# Patient Record
Sex: MALE | Race: WHITE | Employment: FULL TIME
[De-identification: names, ages, dates, MRNs, and addresses within clinical notes are randomized per-mention and may not be internally consistent; named-entity substitution may affect disease eponyms.]

---

## 2024-09-04 ENCOUNTER — TELEPHONE (OUTPATIENT)
Facility: HOSPITAL | Age: 34
End: 2024-09-04

## 2024-09-06 ENCOUNTER — TELEPHONE (OUTPATIENT)
Facility: HOSPITAL | Age: 34
End: 2024-09-06

## 2024-09-06 ENCOUNTER — HOSPITAL ENCOUNTER (OUTPATIENT)
Facility: HOSPITAL | Age: 34
Setting detail: RECURRING SERIES
Discharge: HOME | End: 2024-09-09
Payer: COMMERCIAL

## 2024-09-06 PROCEDURE — 97535 SELF CARE MNGMENT TRAINING: CPT

## 2024-09-06 PROCEDURE — 97161 PT EVAL LOW COMPLEX 20 MIN: CPT

## 2024-09-06 PROCEDURE — 97110 THERAPEUTIC EXERCISES: CPT

## 2024-09-09 ENCOUNTER — HOSPITAL ENCOUNTER (OUTPATIENT)
Facility: HOSPITAL | Age: 34
Setting detail: RECURRING SERIES
Discharge: HOME | End: 2024-09-12
Payer: COMMERCIAL

## 2024-09-09 PROCEDURE — 97112 NEUROMUSCULAR REEDUCATION: CPT

## 2024-09-09 PROCEDURE — 97016 VASOPNEUMATIC DEVICE THERAPY: CPT

## 2024-09-09 PROCEDURE — 97110 THERAPEUTIC EXERCISES: CPT

## 2024-09-09 PROCEDURE — 97116 GAIT TRAINING THERAPY: CPT

## 2024-09-13 ENCOUNTER — HOSPITAL ENCOUNTER (OUTPATIENT)
Facility: HOSPITAL | Age: 34
Setting detail: RECURRING SERIES
Discharge: HOME | End: 2024-09-16
Payer: COMMERCIAL

## 2024-09-13 PROCEDURE — 97530 THERAPEUTIC ACTIVITIES: CPT

## 2024-09-13 PROCEDURE — 97110 THERAPEUTIC EXERCISES: CPT

## 2024-09-13 PROCEDURE — 97112 NEUROMUSCULAR REEDUCATION: CPT

## 2024-09-13 PROCEDURE — 97016 VASOPNEUMATIC DEVICE THERAPY: CPT

## 2024-09-16 ENCOUNTER — HOSPITAL ENCOUNTER (OUTPATIENT)
Facility: HOSPITAL | Age: 34
Setting detail: RECURRING SERIES
Discharge: HOME | End: 2024-09-19
Payer: COMMERCIAL

## 2024-09-16 PROCEDURE — 97110 THERAPEUTIC EXERCISES: CPT

## 2024-09-16 PROCEDURE — 97530 THERAPEUTIC ACTIVITIES: CPT

## 2024-09-16 PROCEDURE — 97016 VASOPNEUMATIC DEVICE THERAPY: CPT

## 2024-09-16 PROCEDURE — 97112 NEUROMUSCULAR REEDUCATION: CPT

## 2024-09-18 ENCOUNTER — TELEPHONE (OUTPATIENT)
Facility: HOSPITAL | Age: 34
End: 2024-09-18

## 2024-09-20 ENCOUNTER — HOSPITAL ENCOUNTER (OUTPATIENT)
Facility: HOSPITAL | Age: 34
Setting detail: RECURRING SERIES
Discharge: HOME | End: 2024-09-23
Payer: COMMERCIAL

## 2024-09-20 PROCEDURE — 97530 THERAPEUTIC ACTIVITIES: CPT

## 2024-09-20 PROCEDURE — 97112 NEUROMUSCULAR REEDUCATION: CPT

## 2024-09-20 PROCEDURE — 97110 THERAPEUTIC EXERCISES: CPT

## 2024-09-23 ENCOUNTER — HOSPITAL ENCOUNTER (OUTPATIENT)
Facility: HOSPITAL | Age: 34
Setting detail: RECURRING SERIES
Discharge: HOME | End: 2024-09-26
Payer: COMMERCIAL

## 2024-09-23 PROCEDURE — 97112 NEUROMUSCULAR REEDUCATION: CPT

## 2024-09-23 PROCEDURE — 97110 THERAPEUTIC EXERCISES: CPT

## 2024-09-23 PROCEDURE — 97530 THERAPEUTIC ACTIVITIES: CPT

## 2024-09-27 ENCOUNTER — HOSPITAL ENCOUNTER (OUTPATIENT)
Facility: HOSPITAL | Age: 34
Setting detail: RECURRING SERIES
Discharge: HOME | End: 2024-09-30
Payer: COMMERCIAL

## 2024-09-27 PROCEDURE — 97110 THERAPEUTIC EXERCISES: CPT

## 2024-09-27 PROCEDURE — 97530 THERAPEUTIC ACTIVITIES: CPT

## 2024-09-27 PROCEDURE — 97112 NEUROMUSCULAR REEDUCATION: CPT

## 2024-09-27 NOTE — PROGRESS NOTES
PHYSICAL / OCCUPATIONAL THERAPY - DAILY TREATMENT NOTE     Patient Name: Torsten Starks    Date: 2024    : 1990  Insurance: Payor: REBA / Plan: REBA POS / Product Type: *No Product type* /      Patient  verified Yes     Visit #   Current / Total 7 24   Time   In / Out 11:25 12:10   Pain   In / Out 0/10 0/10   Subjective Functional Status/Changes: Pt states he is doing well today   Changes to:  Allergies, Med Hx, Sx Hx?   no       TREATMENT AREA =  Right knee pain [M25.561]    If an interpreting service is utilized for treatment of this patient, the contents of this document represent the material reviewed with the patient via the .     OBJECTIVE    Therapeutic Procedures:  Tx Min Billable or 1:1 Min (if diff from Tx Min) Procedure, Rationale, Specifics   25  14428 Therapeutic Exercise (timed):  increase ROM, strength, coordination, balance, and proprioception to improve patient's ability to progress to PLOF and address remaining functional goals. (see flow sheet as applicable)    Details if applicable:       10  16586 Neuromuscular Re-Education (timed):  improve balance, coordination, kinesthetic sense, posture, core stability and proprioception to improve patient's ability to develop conscious control of individual muscles and awareness of position of extremities in order to progress to PLOF and address remaining functional goals. (see flow sheet as applicable)    Details if applicable:     10  16487 Therapeutic Activity (timed):  use of dynamic activities replicating functional movements to increase ROM, strength, coordination, balance, and proprioception in order to improve patient's ability to progress to PLOF and address remaining functional goals.  (see flow sheet as applicable)     Details if applicable:           Details if applicable:            Details if applicable:     45  [facility] Totals Reminder: bill using total billable min of TIMED therapeutic procedures (example: do

## 2024-09-30 ENCOUNTER — HOSPITAL ENCOUNTER (OUTPATIENT)
Facility: HOSPITAL | Age: 34
Setting detail: RECURRING SERIES
Discharge: HOME | End: 2024-10-03
Payer: COMMERCIAL

## 2024-09-30 PROCEDURE — 97112 NEUROMUSCULAR REEDUCATION: CPT

## 2024-09-30 PROCEDURE — 97110 THERAPEUTIC EXERCISES: CPT

## 2024-09-30 PROCEDURE — 97016 VASOPNEUMATIC DEVICE THERAPY: CPT

## 2024-09-30 PROCEDURE — 97530 THERAPEUTIC ACTIVITIES: CPT

## 2024-09-30 NOTE — PROGRESS NOTES
PHYSICAL / OCCUPATIONAL THERAPY - DAILY TREATMENT NOTE     Patient Name: Torsten Starks    Date: 2024    : 1990  Insurance: Payor: REBA / Plan: REBA POS / Product Type: *No Product type* /      Patient  verified Yes     Visit #   Current / Total 8 24   Time   In / Out 1810   Pain   In / Out 0 0   Subjective Functional Status/Changes: \"I have been consistent with initial HEP.\"   Changes to:  Allergies, Med Hx, Sx Hx?   no       TREATMENT AREA =  Right knee pain [M25.561]    If an interpreting service is utilized for treatment of this patient, the contents of this document represent the material reviewed with the patient via the .     OBJECTIVE  Modalities Rationale:     decrease edema, decrease inflammation, decrease pain, increase tissue extensibility, and increase muscle contraction/control to improve patient's ability to progress to PLOF and address remaining functional goals.            10 min [x]  Vasopneumatic Device, press/temp: Med/34 degrees   If using vaso (only need to measure limb vaso being performed on)      pre-treatment girth : 40.0 cm      post-treatment girth :39.8 cm      measured at (landmark location) :   Right knee mid patella     min []  Other:     Skin assessment post-treatment (if applicable):    []  intact    []  redness- no adverse reaction                 []redness - adverse reaction:         Therapeutic Procedures:  Tx Min Billable or 1:1 Min (if diff from Tx Min) Procedure, Rationale, Specifics   63 [id number] Therapeutic Exercise (timed):  increase ROM, strength, coordination, balance, and proprioception to improve patient's ability to progress to PLOF and address remaining functional goals. (see flow sheet as applicable)    Details if applicable:       10  77419 Neuromuscular Re-Education (timed):  improve balance, coordination, kinesthetic sense, posture, core stability and proprioception to improve patient's ability to develop conscious control of

## 2024-10-04 ENCOUNTER — HOSPITAL ENCOUNTER (OUTPATIENT)
Facility: HOSPITAL | Age: 34
Setting detail: RECURRING SERIES
Discharge: HOME | End: 2024-10-07
Payer: COMMERCIAL

## 2024-10-04 PROCEDURE — 97530 THERAPEUTIC ACTIVITIES: CPT

## 2024-10-04 PROCEDURE — 97016 VASOPNEUMATIC DEVICE THERAPY: CPT

## 2024-10-04 PROCEDURE — 97110 THERAPEUTIC EXERCISES: CPT

## 2024-10-04 PROCEDURE — 97112 NEUROMUSCULAR REEDUCATION: CPT

## 2024-10-04 NOTE — PROGRESS NOTES
PHYSICAL / OCCUPATIONAL THERAPY - DAILY TREATMENT NOTE     Patient Name: Torsten Starks    Date: 10/4/2024    : 1990  Insurance: Payor: REBA / Plan: REBA POS / Product Type: *No Product type* /      Patient  verified Yes     Visit #   Current / Total 9 24   Time   In / Out 11:30 am 12:20 pm   Pain   In / Out 0 0   Subjective Functional Status/Changes: Pt reports he has been doing well. Notes he has not had any knee buckling issues though is still hesitant with using his knee.   Changes to:  Allergies, Med Hx, Sx Hx?   no       TREATMENT AREA =  Right knee pain [M25.561]    If an interpreting service is utilized for treatment of this patient, the contents of this document represent the material reviewed with the patient via the .     OBJECTIVE  Modalities Rationale:     decrease edema, decrease inflammation, decrease pain, increase tissue extensibility, and increase muscle contraction/control to improve patient's ability to progress to PLOF and address remaining functional goals.            10 min [x]  Vasopneumatic Device, press/temp: Med/34 degrees   If using vaso (only need to measure limb vaso being performed on)      pre-treatment girth : 40.0 cm      post-treatment girth :39.8 cm      measured at (landmark location) :   Right knee mid patella     min []  Other:     Skin assessment post-treatment (if applicable):    []  intact    []  redness- no adverse reaction                 []redness - adverse reaction:         Therapeutic Procedures:  Tx Min Billable or 1:1 Min (if diff from Tx Min) Procedure, Rationale, Specifics   23  00715 Therapeutic Exercise (timed):  increase ROM, strength, coordination, balance, and proprioception to improve patient's ability to progress to PLOF and address remaining functional goals. (see flow sheet as applicable)    Details if applicable:       8  60532 Neuromuscular Re-Education (timed):  improve balance, coordination, kinesthetic sense, posture,

## 2024-10-07 ENCOUNTER — HOSPITAL ENCOUNTER (OUTPATIENT)
Facility: HOSPITAL | Age: 34
Setting detail: RECURRING SERIES
Discharge: HOME | End: 2024-10-10
Payer: COMMERCIAL

## 2024-10-07 PROCEDURE — 97016 VASOPNEUMATIC DEVICE THERAPY: CPT

## 2024-10-07 PROCEDURE — 97110 THERAPEUTIC EXERCISES: CPT

## 2024-10-07 PROCEDURE — 97112 NEUROMUSCULAR REEDUCATION: CPT

## 2024-10-07 PROCEDURE — 97530 THERAPEUTIC ACTIVITIES: CPT

## 2024-10-07 NOTE — PROGRESS NOTES
PHYSICAL / OCCUPATIONAL THERAPY - DAILY TREATMENT NOTE     Patient Name: Torsten Starks    Date: 10/7/2024    : 1990  Insurance: Payor: REBA / Plan: REBA POS / Product Type: *No Product type* /      Patient  verified Yes     Visit #   Current / Total 10 24   Time   In / Out 1720 1820   Pain   In / Out 0 0   Subjective Functional Status/Changes: \"I have a follow-up with the surgeon tomorrow.\"    Changes to:  Allergies, Med Hx, Sx Hx?   no       TREATMENT AREA =  Right knee pain [M25.561]    If an interpreting service is utilized for treatment of this patient, the contents of this document represent the material reviewed with the patient via the .     OBJECTIVE  Modalities Rationale:     decrease edema, decrease inflammation, decrease pain, increase tissue extensibility, and increase muscle contraction/control to improve patient's ability to progress to PLOF and address remaining functional goals.            10 min [x]  Vasopneumatic Device, press/temp: Med/34 degrees   If using vaso (only need to measure limb vaso being performed on)      pre-treatment girth : 39.5 cm      post-treatment girth :39.2 cm      measured at (landmark location) :   Right knee mid patella     min []  Other:     Skin assessment post-treatment (if applicable):    []  intact    []  redness- no adverse reaction                 []redness - adverse reaction:         Therapeutic Procedures:  Tx Min Billable or 1:1 Min (if diff from Tx Min) Procedure, Rationale, Specifics   25  53448 Therapeutic Exercise (timed):  increase ROM, strength, coordination, balance, and proprioception to improve patient's ability to progress to PLOF and address remaining functional goals. (see flow sheet as applicable)    Details if applicable:       15  10149 Neuromuscular Re-Education (timed):  improve balance, coordination, kinesthetic sense, posture, core stability and proprioception to improve patient's ability to develop conscious 
ambulation/ADLs.  Eval Status: 8-60 AAROM  Current: In-progress, 3-125 degrees, 10/7/2024     Pt will have 5/5 right knee ext and flex strength to return to goals of lifting void of impairment.  Eval Status: grossly 2/5  Current: In-progress, 3+/5 grossly, 10/7/2024     Patient will demonstrate ability to perform SL squat x 10 reps to 60 degrees knee flexion with good control to facilitate progression to return to plyometrics and running.  Eval Status: unable to perform due to post op status  Current: progressing with eccentric step downs with some UE assist 10/4/24     Pt demonstrate ability to reciprocally negotiate stairs  void of AD with good mechanics void of compensatory movement to improve ease of community mobility.  Eval Status: non-reciprocal with B crutch assist  Current: Pt reports ability to perform reciprocal patterning when ascending stairs but step to pattern when descending stairs utilizing B handrails 9/16/24  Current: pt states that he continues to use compensatory strategies, including \"turning to the side a little bit,\" in order to be able to achieve reciprocal gait pattern when descending stairs.  Pt states he is still using bilateral handrails as well.  9/27/24 in-progress     6. Pt will demonstrate SL hop, triple hop, crossover hop of 90% of contralateral side to facilitate progression towards return to recreational sport participation.              Eval status: not performed due to post op status.    Summary of Care/ Key Functional Changes:   Patient is 5 weeks s/p right ACL reconstruction. Patient is progressing well toward his short and long term goals in accordance to ACL rehabilitation protocol. He is compliant with initial HEP. Patient is developing strength and functional mobility. He continues to demonstrate LE weakness. He has transitioned to initial WB strengthening exercise and will continue to progress in accordance to post surgical rehabilitation protocol.      Patient will

## 2024-10-10 ENCOUNTER — HOSPITAL ENCOUNTER (OUTPATIENT)
Facility: HOSPITAL | Age: 34
Setting detail: RECURRING SERIES
Discharge: HOME | End: 2024-10-13
Payer: COMMERCIAL

## 2024-10-10 PROCEDURE — 97530 THERAPEUTIC ACTIVITIES: CPT

## 2024-10-10 PROCEDURE — 97112 NEUROMUSCULAR REEDUCATION: CPT

## 2024-10-10 PROCEDURE — 97110 THERAPEUTIC EXERCISES: CPT

## 2024-10-10 PROCEDURE — 97016 VASOPNEUMATIC DEVICE THERAPY: CPT

## 2024-10-10 NOTE — PROGRESS NOTES
Skin assessment post-treatment (if applicable):    [x]  intact    []  redness- no adverse reaction                 []redness - adverse reaction:         Therapeutic Procedures:  Tx Min Billable or 1:1 Min (if diff from Tx Min) Procedure, Rationale, Specifics   23  83631 Therapeutic Exercise (timed):  increase ROM, strength, coordination, balance, and proprioception to improve patient's ability to progress to PLOF and address remaining functional goals. (see flow sheet as applicable)    Details if applicable:       10  12067 Neuromuscular Re-Education (timed):  improve balance, coordination, kinesthetic sense, posture, core stability and proprioception to improve patient's ability to develop conscious control of individual muscles and awareness of position of extremities in order to progress to PLOF and address remaining functional goals. (see flow sheet as applicable)    Details if applicable:     9  62169 Therapeutic Activity (timed):  use of dynamic activities replicating functional movements to increase ROM, strength, coordination, balance, and proprioception in order to improve patient's ability to progress to PLOF and address remaining functional goals.  (see flow sheet as applicable)     Details if applicable:           Details if applicable:            Details if applicable:     42  [facility] Totals Reminder: bill using total billable min of TIMED therapeutic procedures (example: do not include dry needle or estim unattended, both untimed codes, in totals to left)  8-22 min = 1 unit; 23-37 min = 2 units; 38-52 min = 3 units; 53-67 min = 4 units; 68-82 min = 5 units   Total Total     TOTAL TREATMENT TIME:        52     [x]  Patient Education billed concurrently with other procedures   [x] Review HEP    [] Progressed/Changed HEP, detail:    [] Other detail:       Objective Information/Functional Measures/Assessment    Pt reports ability to perform reciprocal patterning when ascending stairs with one handrail for

## 2024-10-11 ENCOUNTER — APPOINTMENT (OUTPATIENT)
Facility: HOSPITAL | Age: 34
End: 2024-10-11
Payer: COMMERCIAL

## 2024-10-14 ENCOUNTER — HOSPITAL ENCOUNTER (OUTPATIENT)
Facility: HOSPITAL | Age: 34
Setting detail: RECURRING SERIES
Discharge: HOME | End: 2024-10-17
Payer: COMMERCIAL

## 2024-10-14 PROCEDURE — 97112 NEUROMUSCULAR REEDUCATION: CPT

## 2024-10-14 PROCEDURE — 97110 THERAPEUTIC EXERCISES: CPT

## 2024-10-14 PROCEDURE — 97530 THERAPEUTIC ACTIVITIES: CPT

## 2024-10-14 NOTE — PROGRESS NOTES
PHYSICAL / OCCUPATIONAL THERAPY - DAILY TREATMENT NOTE     Patient Name: Torsten Starks    Date: 10/14/2024    : 1990  Insurance: Payor: REBA / Plan: REBA POS / Product Type: *No Product type* /      Patient  verified Yes     Visit #   Current / Total 12 24   Time   In / Out 17:24 18:04   Pain   In / Out 0 0   Subjective Functional Status/Changes: Today was my first day back at work   Changes to:  Allergies, Med Hx, Sx Hx?   no       TREATMENT AREA =  Right knee pain [M25.561]    If an interpreting service is utilized for treatment of this patient, the contents of this document represent the material reviewed with the patient via the .     OBJECTIVE    Therapeutic Procedures:  Tx Min Billable or 1:1 Min (if diff from Tx Min) Procedure, Rationale, Specifics   23  94979 Therapeutic Exercise (timed):  increase ROM, strength, coordination, balance, and proprioception to improve patient's ability to progress to PLOF and address remaining functional goals. (see flow sheet as applicable)    Details if applicable:       8  36287 Neuromuscular Re-Education (timed):  improve balance, coordination, kinesthetic sense, posture, core stability and proprioception to improve patient's ability to develop conscious control of individual muscles and awareness of position of extremities in order to progress to PLOF and address remaining functional goals. (see flow sheet as applicable)    Details if applicable:     9  88682 Therapeutic Activity (timed):  use of dynamic activities replicating functional movements to increase ROM, strength, coordination, balance, and proprioception in order to improve patient's ability to progress to PLOF and address remaining functional goals.  (see flow sheet as applicable)     Details if applicable:           Details if applicable:            Details if applicable:     40  [facility] Totals Reminder: bill using total billable min of TIMED therapeutic procedures (example: do

## 2024-10-17 ENCOUNTER — HOSPITAL ENCOUNTER (OUTPATIENT)
Facility: HOSPITAL | Age: 34
Setting detail: RECURRING SERIES
Discharge: HOME | End: 2024-10-20
Payer: COMMERCIAL

## 2024-10-17 PROCEDURE — 97112 NEUROMUSCULAR REEDUCATION: CPT

## 2024-10-17 PROCEDURE — 97110 THERAPEUTIC EXERCISES: CPT

## 2024-10-17 PROCEDURE — 97530 THERAPEUTIC ACTIVITIES: CPT

## 2024-10-17 NOTE — PROGRESS NOTES
AM    Future Appointments   Date Time Provider Department Center   10/22/2024  8:10 AM Nima Gaona, PT [facility]   10/25/2024  3:30 PM Nima Gaona, PT [facility]   10/28/2024  5:30 PM Alex Hahn, PT [facility]   11/1/2024  3:30 PM Alex Hahn, PT [facility]

## 2024-10-18 ENCOUNTER — APPOINTMENT (OUTPATIENT)
Facility: HOSPITAL | Age: 34
End: 2024-10-18
Payer: COMMERCIAL

## 2024-10-21 ENCOUNTER — APPOINTMENT (OUTPATIENT)
Facility: HOSPITAL | Age: 34
End: 2024-10-21
Payer: COMMERCIAL

## 2024-10-22 ENCOUNTER — HOSPITAL ENCOUNTER (OUTPATIENT)
Facility: HOSPITAL | Age: 34
Setting detail: RECURRING SERIES
Discharge: HOME | End: 2024-10-25
Payer: COMMERCIAL

## 2024-10-22 PROCEDURE — 97112 NEUROMUSCULAR REEDUCATION: CPT

## 2024-10-22 PROCEDURE — 97110 THERAPEUTIC EXERCISES: CPT

## 2024-10-22 PROCEDURE — 97530 THERAPEUTIC ACTIVITIES: CPT

## 2024-10-22 NOTE — PROGRESS NOTES
Other:    Nima Gaona, [place]    10/22/2024    8:13 AM    Future Appointments   Date Time Provider Department Center   10/25/2024  3:30 PM Nima Gaona, PT [facility]   10/28/2024  5:30 PM Alex Hahn, PT [facility]   11/1/2024  3:30 PM Alex Hahn, PT [facility]

## 2024-10-25 ENCOUNTER — HOSPITAL ENCOUNTER (OUTPATIENT)
Facility: HOSPITAL | Age: 34
Setting detail: RECURRING SERIES
Discharge: HOME | End: 2024-10-28
Payer: COMMERCIAL

## 2024-10-25 PROCEDURE — 97112 NEUROMUSCULAR REEDUCATION: CPT

## 2024-10-25 PROCEDURE — 97530 THERAPEUTIC ACTIVITIES: CPT

## 2024-10-25 PROCEDURE — 97016 VASOPNEUMATIC DEVICE THERAPY: CPT

## 2024-10-25 PROCEDURE — 97110 THERAPEUTIC EXERCISES: CPT

## 2024-10-25 NOTE — PROGRESS NOTES
PHYSICAL / OCCUPATIONAL THERAPY - DAILY TREATMENT NOTE (updated )    Patient Name: Torsten Starks    Date: 10/25/2024    : 1990  Insurance: Payor: REBA / Plan: REBA POS / Product Type: *No Product type* /      Patient  verified Yes     Visit #   Current / Total 15 24   Time   In / Out 1525 1624   Pain   In / Out 0 0   Subjective Functional Status/Changes: \"The knee has been feeling fine past few days.\"   Changes to:  Meds, Allergies, Med Hx, Sx Hx?  If yes, update Summary List no       TREATMENT AREA =  Right knee pain [M25.561]    If an interpreting service is utilized for treatment of this patient, the contents of this document represent the material reviewed with the patient via the .     OBJECTIVE  Modalities Rationale:     decrease edema and decrease pain to improve patient's ability to Complete ADLS  10 min [x]  Vasopneumatic Device, press/temp: Medium, low      If using vaso (need to measure limb vaso being performed on)      pre-treatment girth : 39.4 cm.      post-treatment girth : 39.1 cm.      measured at (landmark location): mid-patella       min []  Other:    [x] Skin assessment post-treatment (if applicable):    [x]  intact    []  redness- no adverse reaction     []redness - adverse reaction:        Therapeutic Procedures:  Tx Min Billable or 1:1 Min (if diff from Tx Min) Procedure, Rationale, Specifics     [id number] Therapeutic Exercise (timed):  increase ROM, strength, coordination, balance, and proprioception to improve patient's ability to progress to PLOF and address remaining functional goals. (see flow sheet as applicable)     Details if applicable:       15  75169 Therapeutic Activity (timed):  use of dynamic activities replicating functional movements to increase ROM, strength, coordination, balance, and proprioception in order to improve patient's ability to progress to PLOF and address remaining functional goals.  (see flow sheet as applicable)     Details if

## 2024-10-28 ENCOUNTER — HOSPITAL ENCOUNTER (OUTPATIENT)
Facility: HOSPITAL | Age: 34
Setting detail: RECURRING SERIES
Discharge: HOME | End: 2024-10-31
Payer: COMMERCIAL

## 2024-10-28 ENCOUNTER — TELEPHONE (OUTPATIENT)
Facility: HOSPITAL | Age: 34
End: 2024-10-28

## 2024-10-28 PROCEDURE — 97530 THERAPEUTIC ACTIVITIES: CPT

## 2024-10-28 PROCEDURE — 97112 NEUROMUSCULAR REEDUCATION: CPT

## 2024-10-28 PROCEDURE — 97110 THERAPEUTIC EXERCISES: CPT

## 2024-10-28 NOTE — PROGRESS NOTES
PHYSICAL / OCCUPATIONAL THERAPY - DAILY TREATMENT NOTE     Patient Name: Torsten Starks    Date: 10/28/2024    : 1990  Insurance: Payor: FELIASPARUL / Plan: REBA POS / Product Type: *No Product type* /      Patient  verified Yes     Visit #   Current / Total 16 24   Time   In / Out 16:09 16:50   Pain   In / Out 0 0   Subjective Functional Status/Changes: Pt reports occasionally tightness in right hamstring   Changes to:  Allergies, Med Hx, Sx Hx?   no       TREATMENT AREA =  Right knee pain [M25.561]    If an interpreting service is utilized for treatment of this patient, the contents of this document represent the material reviewed with the patient via the .     OBJECTIVE      Therapeutic Procedures:  Tx Min Billable or 1:1 Min (if diff from Tx Min) Procedure, Rationale, Specifics   23  70742 Therapeutic Exercise (timed):  increase ROM, strength, coordination, balance, and proprioception to improve patient's ability to progress to PLOF and address remaining functional goals. (see flow sheet as applicable)    Details if applicable:       9  69892 Neuromuscular Re-Education (timed):  improve balance, coordination, kinesthetic sense, posture, core stability and proprioception to improve patient's ability to develop conscious control of individual muscles and awareness of position of extremities in order to progress to PLOF and address remaining functional goals. (see flow sheet as applicable)    Details if applicable:     9  29276 Therapeutic Activity (timed):  use of dynamic activities replicating functional movements to increase ROM, strength, coordination, balance, and proprioception in order to improve patient's ability to progress to PLOF and address remaining functional goals.  (see flow sheet as applicable)     Details if applicable:           Details if applicable:            Details if applicable:     41  [facility] Totals Reminder: bill using total billable min of TIMED therapeutic

## 2024-11-01 ENCOUNTER — HOSPITAL ENCOUNTER (OUTPATIENT)
Facility: HOSPITAL | Age: 34
Setting detail: RECURRING SERIES
Discharge: HOME | End: 2024-11-04
Payer: COMMERCIAL

## 2024-11-01 PROCEDURE — 97112 NEUROMUSCULAR REEDUCATION: CPT

## 2024-11-01 PROCEDURE — 97110 THERAPEUTIC EXERCISES: CPT

## 2024-11-01 PROCEDURE — 97530 THERAPEUTIC ACTIVITIES: CPT

## 2024-11-05 ENCOUNTER — HOSPITAL ENCOUNTER (OUTPATIENT)
Facility: HOSPITAL | Age: 34
Setting detail: RECURRING SERIES
Discharge: HOME | End: 2024-11-08
Payer: COMMERCIAL

## 2024-11-05 PROCEDURE — 97530 THERAPEUTIC ACTIVITIES: CPT

## 2024-11-05 PROCEDURE — 97112 NEUROMUSCULAR REEDUCATION: CPT

## 2024-11-05 PROCEDURE — 97110 THERAPEUTIC EXERCISES: CPT

## 2024-11-05 NOTE — PROGRESS NOTES
PHYSICAL / OCCUPATIONAL THERAPY - DAILY TREATMENT NOTE (updated )    Patient Name: Torsten Starks    Date: 2024    : 1990  Insurance: Payor: REBA / Plan: FELISAPARUL POS / Product Type: *No Product type* /      Patient  verified Yes     Visit #   Current / Total 18 24   Time   In / Out 1610 1710   Pain   In / Out 0 0   Subjective Functional Status/Changes: \"I am being careful not to do anything to risk injuring the knee, but I am finding I can do more as I gain confidence.\"   Changes to:  Meds, Allergies, Med Hx, Sx Hx?  If yes, update Summary List no       TREATMENT AREA =  Right knee pain [M25.561]    If an interpreting service is utilized for treatment of this patient, the contents of this document represent the material reviewed with the patient via the .     OBJECTIVE    Therapeutic Procedures:  Tx Min Billable or 1:1 Min (if diff from Tx Min) Procedure, Rationale, Specifics   25  70361 Therapeutic Exercise (timed):  increase ROM, strength, coordination, balance, and proprioception to improve patient's ability to progress to PLOF and address remaining functional goals. (see flow sheet as applicable)     Details if applicable:       23  58793 Therapeutic Activity (timed):  use of dynamic activities replicating functional movements to increase ROM, strength, coordination, balance, and proprioception in order to improve patient's ability to progress to PLOF and address remaining functional goals.  (see flow sheet as applicable)     Details if applicable:     12  33314 Neuromuscular Re-Education (timed):  improve balance, coordination, kinesthetic sense, posture, core stability and proprioception to improve patient's ability to develop conscious control of individual muscles and awareness of position of extremities in order to progress to PLOF and address remaining functional goals. (see flow sheet as applicable)     Details if applicable:     60  [facility] Totals Reminder: bill using

## 2024-11-05 NOTE — PROGRESS NOTES
[facility]  [address] [address]          Phone: [phone]   Fax: [phone]    Progress Note  Patient name: Torsten Starks Start of Care: 24    Referral source: Torsten Aguilar MD : 1990   Medical/Treatment Diagnosis: Right knee pain [M25.561] Onset Date:DOS 24      Prior Hospitalization: see medical history Provider#: [id number]   Medications: Verified on Patient Summary List    Comorbidities:  Musculoskeletal disorders left ACL reconstruction, right ACL reconstruction in , tonsillectomy, adenoidectomy   Prior Level of Function:functionally independent, no AD, Active lifestyle, volleyball, kickball, softball recreationally     Visits from Start of Care: 18        Updated Goals/Measure of Progress:     Short Term Goals: To be accomplished in 8 treatments       Patient will be independent and compliant with HEP to progress toward goals and restore functional mobility.   Eval Status: issued at eval  Current: In-progress, added partial lunges WBAT to HEP, 2024     Pt will demonstrate right knee PROM 1-0-120 to aid in functional mechanics for ambulation/ADLs.  Eval Status: 7-70 degrees 24  Current: Met, 0-128 degrees 10/7/24     Pt will demonstrate SLR x 10 reps void of quad lag to reduce risk of knee buckling and falls during ambulation.  Eval Status: quad lag with attempted SLR  Current: mild quad lag present, able to perform descent with minimal to no assist but requires assist for concentric portion to maintain TKE knee positioning, 24  Current: Met, 2024     Long Term Goals: To be accomplished in 24 treatments      Patient will improve LEFS score to 60 points to indicate significant improvement in functional status.  Eval Status: LEFS 20  Current: In-progress, 58     Pt will demonstrate right knee AROM 1-0-130 to aid in functional mechanics for ambulation/ADLs.  Eval Status: 8-60 AAROM  Current: Met, +1-132 degrees,

## 2024-11-08 ENCOUNTER — TELEPHONE (OUTPATIENT)
Facility: HOSPITAL | Age: 34
End: 2024-11-08

## 2024-11-12 ENCOUNTER — HOSPITAL ENCOUNTER (OUTPATIENT)
Facility: HOSPITAL | Age: 34
Setting detail: RECURRING SERIES
Discharge: HOME | End: 2024-11-15
Payer: COMMERCIAL

## 2024-11-12 PROCEDURE — 97530 THERAPEUTIC ACTIVITIES: CPT

## 2024-11-12 PROCEDURE — 97110 THERAPEUTIC EXERCISES: CPT

## 2024-11-12 PROCEDURE — 97112 NEUROMUSCULAR REEDUCATION: CPT

## 2024-11-12 NOTE — PROGRESS NOTES
12/2/2024  5:30 PM Alex Hahn, PT [facility] [place]   12/9/2024  5:30 PM Alex Hahn, PT [facility] [place]   12/16/2024  5:30 PM Alex Hahn, PT [facility] [place]

## 2024-11-18 ENCOUNTER — APPOINTMENT (OUTPATIENT)
Facility: HOSPITAL | Age: 34
End: 2024-11-18
Payer: COMMERCIAL

## 2024-11-18 ENCOUNTER — TELEPHONE (OUTPATIENT)
Facility: HOSPITAL | Age: 34
End: 2024-11-18

## 2024-11-25 ENCOUNTER — HOSPITAL ENCOUNTER (OUTPATIENT)
Facility: HOSPITAL | Age: 34
Setting detail: RECURRING SERIES
Discharge: HOME | End: 2024-11-28
Payer: COMMERCIAL

## 2024-11-25 PROCEDURE — 97110 THERAPEUTIC EXERCISES: CPT

## 2024-11-25 PROCEDURE — 97112 NEUROMUSCULAR REEDUCATION: CPT

## 2024-11-25 PROCEDURE — 97530 THERAPEUTIC ACTIVITIES: CPT

## 2024-11-25 NOTE — PROGRESS NOTES
PHYSICAL / OCCUPATIONAL THERAPY - DAILY TREATMENT NOTE (updated )    Patient Name: Torsten Starks    Date: 2024    : 1990  Insurance: Payor: REBA / Plan: REBA POS / Product Type: *No Product type* /      Patient  verified Yes     Visit #   Current / Total 20 24   Time   In / Out 1728 1821   Pain   In / Out 0 0   Subjective Functional Status/Changes: \"I will be seeing the surgeon next on 12/10/24. He will decide then if I am ready to begin light jogging.\"   Changes to:  Meds, Allergies, Med Hx, Sx Hx?  If yes, update Summary List no       TREATMENT AREA =  Right knee pain [M25.561]    If an interpreting service is utilized for treatment of this patient, the contents of this document represent the material reviewed with the patient via the .     OBJECTIVE    Therapeutic Procedures:  Tx Min Billable or 1:1 Min (if diff from Tx Min) Procedure, Rationale, Specifics   23  55514 Therapeutic Exercise (timed):  increase ROM, strength, coordination, balance, and proprioception to improve patient's ability to progress to PLOF and address remaining functional goals. (see flow sheet as applicable)     Details if applicable:       15  45967 Therapeutic Activity (timed):  use of dynamic activities replicating functional movements to increase ROM, strength, coordination, balance, and proprioception in order to improve patient's ability to progress to PLOF and address remaining functional goals.  (see flow sheet as applicable)     Details if applicable:     15  64453 Neuromuscular Re-Education (timed):  improve balance, coordination, kinesthetic sense, posture, core stability and proprioception to improve patient's ability to develop conscious control of individual muscles and awareness of position of extremities in order to progress to PLOF and address remaining functional goals. (see flow sheet as applicable)     Details if applicable:     53  [facility] Totals Reminder: bill using total

## 2024-12-02 ENCOUNTER — HOSPITAL ENCOUNTER (OUTPATIENT)
Facility: HOSPITAL | Age: 34
Setting detail: RECURRING SERIES
Discharge: HOME | End: 2024-12-05
Payer: COMMERCIAL

## 2024-12-02 PROCEDURE — 97530 THERAPEUTIC ACTIVITIES: CPT

## 2024-12-02 PROCEDURE — 97110 THERAPEUTIC EXERCISES: CPT

## 2024-12-02 PROCEDURE — 97112 NEUROMUSCULAR REEDUCATION: CPT

## 2024-12-02 NOTE — PROGRESS NOTES
PHYSICAL / OCCUPATIONAL THERAPY - DAILY TREATMENT NOTE (updated )    Patient Name: Torsten Starks    Date: 2024    : 1990  Insurance: Payor: FELISAPARUL / Plan: REBA POS / Product Type: *No Product type* /      Patient  verified Yes     Visit #   Current / Total 21 24   Time   In / Out 5:35 pm 6:29 pm   Pain   In / Out 0 0   Subjective Functional Status/Changes: Pt reports he has been doing well noting no pain in his knee lately. States he is seeing his surgeon to get cleared to begin progressing towards return to running next week.    Changes to:  Meds, Allergies, Med Hx, Sx Hx?  If yes, update Summary List no       TREATMENT AREA =  Right knee pain [M25.561]    If an interpreting service is utilized for treatment of this patient, the contents of this document represent the material reviewed with the patient via the .     OBJECTIVE    Therapeutic Procedures:  Tx Min Billable or 1:1 Min (if diff from Tx Min) Procedure, Rationale, Specifics   14  20963 Therapeutic Exercise (timed):  increase ROM, strength, coordination, balance, and proprioception to improve patient's ability to progress to PLOF and address remaining functional goals. (see flow sheet as applicable)     Details if applicable:       25  68923 Therapeutic Activity (timed):  use of dynamic activities replicating functional movements to increase ROM, strength, coordination, balance, and proprioception in order to improve patient's ability to progress to PLOF and address remaining functional goals.  (see flow sheet as applicable)     Details if applicable:     15  23480 Neuromuscular Re-Education (timed):  improve balance, coordination, kinesthetic sense, posture, core stability and proprioception to improve patient's ability to develop conscious control of individual muscles and awareness of position of extremities in order to progress to PLOF and address remaining functional goals. (see flow sheet as applicable) 
1-0-130 to aid in functional mechanics for ambulation/ADLs.  Eval Status: 8-60 AAROM  Current: Met, +1-132 degrees, 11/5/2024     Pt will have 5/5 right knee ext and flex strength to return to goals of lifting void of impairment.  Eval Status: grossly 2/5  Current: Progressing, knee flexion 4/5, knee ext 5-/5 12/2/24     Patient will demonstrate ability to perform SL squat x 10 reps to 60 degrees knee flexion with good control to facilitate progression to return to plyometrics and running.  Eval Status: unable to perform due to post op status  Current:In-progress, performing for sets of 8 reps with fair control and some dynamic knee valgus 12/2/24     Pt demonstrate ability to reciprocally negotiate stairs  void of AD with good mechanics void of compensatory movement to improve ease of community mobility.  Eval Status: non-reciprocal with B crutch assist  Current: Pt reports ability to perform normal reciprocal patterning when ascending and descending stairs void of AD  Met 10/28/24     6. Pt will demonstrate SL hop, triple hop, crossover hop of 90% of contralateral side to facilitate progression towards return to recreational sport participation.              Eval status: not performed due to post op status.              Current: In-progress, advancing single limb loading to reduced stability surfaces of Dynadisc and BOSU, 11/25/2024    Summary of Care/ Key Functional Changes: Pt has attended 21 PT appts s/p right ACL reconstruction 9/4/24. HE is progressing well demonstrating near full knee ROM and is progressing with LE strength and dynamic stability in stance though remains limited with eccentric quad and HS strength and single leg dynamic stability. He will continue to benefit from skilled PT with plan to progress into plyometrics in preparation for return to running progression, pending his upcoming follow up with his surgeon on 12/10/24.     ASSESSMENT/RECOMMENDATIONS:   Patient would benefit from the

## 2024-12-09 ENCOUNTER — HOSPITAL ENCOUNTER (OUTPATIENT)
Facility: HOSPITAL | Age: 34
Setting detail: RECURRING SERIES
Discharge: HOME | End: 2024-12-12
Payer: COMMERCIAL

## 2024-12-09 PROCEDURE — 97110 THERAPEUTIC EXERCISES: CPT

## 2024-12-09 PROCEDURE — 97112 NEUROMUSCULAR REEDUCATION: CPT

## 2024-12-09 PROCEDURE — 97530 THERAPEUTIC ACTIVITIES: CPT

## 2024-12-09 NOTE — PROGRESS NOTES
include dry needle or estim unattended, both untimed codes, in totals to left)  8-22 min = 1 unit; 23-37 min = 2 units; 38-52 min = 3 units; 53-67 min = 4 units; 68-82 min = 5 units   Total Total       TOTAL TREATMENT TIME:        70       [x]  Patient Education billed concurrently with other procedures   [x] Review HEP    [] Progressed/Changed HEP, detail:    [] Other detail:       Objective Information/Functional Measures/Assessment    Focused on dynamic stability and single limb strength as patient is ready to progress toward dynamic activities like running on an even surface and changing directions. He was challenged with exercise prescribed but reported no adverse reaction to treatment. Will introduce ladder drills next visit.     Patient will continue to benefit from skilled PT services to modify and progress therapeutic interventions, analyze and address functional mobility deficits, analyze and address ROM deficits, analyze and address strength deficits, analyze and address soft tissue restrictions, analyze and cue for proper movement patterns, analyze and modify for postural abnormalities, analyze and address imbalance/dizziness, and instruct in home and community integration to address functional deficits and attain remaining goals.    Progress toward goals / Updated goals:  []  See Progress Note/Recertification    Short Term Goals: To be accomplished in 8 treatments       Patient will be independent and compliant with HEP to progress toward goals and restore functional mobility.   Eval Status: issued at eval  Current: In-progress, added partial lunges WBAT to HEP, 11/1/2024     Pt will demonstrate right knee PROM 1-0-120 to aid in functional mechanics for ambulation/ADLs.  Eval Status: 7-70 degrees 9/9/24  Current: Met, 0-128 degrees 10/7/24     Pt will demonstrate SLR x 10 reps void of quad lag to reduce risk of knee buckling and falls during ambulation.  Eval Status: quad lag with attempted

## 2024-12-16 ENCOUNTER — HOSPITAL ENCOUNTER (OUTPATIENT)
Facility: HOSPITAL | Age: 34
Setting detail: RECURRING SERIES
Discharge: HOME | End: 2024-12-19
Payer: COMMERCIAL

## 2024-12-16 PROCEDURE — 97112 NEUROMUSCULAR REEDUCATION: CPT

## 2024-12-16 PROCEDURE — 97530 THERAPEUTIC ACTIVITIES: CPT

## 2024-12-16 PROCEDURE — 97110 THERAPEUTIC EXERCISES: CPT

## 2024-12-16 NOTE — PROGRESS NOTES
to post op status.              Current: In-progress, advancing single limb loading to reduced stability surfaces of Dynadisc and BOSU, 11/25/2024    PLAN  Yes  Continue plan of care  []  Upgrade activities as tolerated  []  Discharge due to:   [x]  Other:    Alex Hahn PT    12/16/2024    6:36 PM    Future Appointments   Date Time Provider Department Center   12/30/2024  4:50 PM Alex Hahn, PT [facility]   1/6/2025  4:50 PM Alex Hahn, PT [facility]   1/13/2025  4:50 PM Alex Hahn, PT [facility]   1/20/2025 10:50 AM [facility]

## 2024-12-30 ENCOUNTER — HOSPITAL ENCOUNTER (OUTPATIENT)
Facility: HOSPITAL | Age: 34
Setting detail: RECURRING SERIES
Discharge: HOME | End: 2025-01-02
Payer: COMMERCIAL

## 2024-12-30 PROCEDURE — 97112 NEUROMUSCULAR REEDUCATION: CPT

## 2024-12-30 PROCEDURE — 97110 THERAPEUTIC EXERCISES: CPT

## 2024-12-30 PROCEDURE — 97530 THERAPEUTIC ACTIVITIES: CPT

## 2024-12-30 NOTE — PROGRESS NOTES
PHYSICAL / OCCUPATIONAL THERAPY - DAILY TREATMENT NOTE (updated )    Patient Name: Torsten Starks    Date: 2024    : 1990  Insurance: Payor: REBA / Plan: FELISAPARUL POS / Product Type: *No Product type* /      Patient  verified Yes     Visit #   Current / Total 24 24   Time   In / Out 4:45 pm 5:55 pm   Pain   In / Out 0 0   Subjective Functional Status/Changes: \"Its been doing well. I've been working out and have not had any problems. My doc cleared me to start jogging.\"   Changes to:  Meds, Allergies, Med Hx, Sx Hx?  If yes, update Summary List no       TREATMENT AREA =  Right knee pain [M25.561]    If an interpreting service is utilized for treatment of this patient, the contents of this document represent the material reviewed with the patient via the .     OBJECTIVE    Therapeutic Procedures:  Tx Min Billable or 1:1 Min (if diff from Tx Min) Procedure, Rationale, Specifics   15  87128 Therapeutic Exercise (timed):  increase ROM, strength, coordination, balance, and proprioception to improve patient's ability to progress to PLOF and address remaining functional goals. (see flow sheet as applicable)     Details if applicable:       30  06236 Therapeutic Activity (timed):  use of dynamic activities replicating functional movements to increase ROM, strength, coordination, balance, and proprioception in order to improve patient's ability to progress to PLOF and address remaining functional goals.  (see flow sheet as applicable)     Details if applicable:     25  75393 Neuromuscular Re-Education (timed):  improve balance, coordination, kinesthetic sense, posture, core stability and proprioception to improve patient's ability to develop conscious control of individual muscles and awareness of position of extremities in order to progress to PLOF and address remaining functional goals. (see flow sheet as applicable)     Details if applicable:     70  [facility] Totals Reminder: bill using

## 2024-12-31 NOTE — PROGRESS NOTES
[facility] [place]  [address] [address]  Phone: [phone]   Fax: [phone]      Continued Plan of Care/ Re-certification for Physical Therapy Services    Patient name: Torsten Starks Start of Care: 24    Referral source: Torsten Aguilar MD : 1990   Medical/Treatment Diagnosis: Right knee pain [M25.561] Onset Date:DOS 24       Prior Hospitalization: see medical history Provider#: [id number]   Medications: Verified on Patient Summary List     Comorbidities:  Musculoskeletal disorders left ACL reconstruction, right ACL reconstruction in 2012, tonsillectomy, adenoidectomy   Prior Level of Function:functionally independent, no AD, Active lifestyle, volleyball, kickball, softball recreationally     Visits from Start of Care: 24    Missed Visits: 0    Reporting Period: 24 to 24    The Plan of Care and following information is based on the patient's current status:    Key functional changes: progressing with LE strength, initiated jogging and low level plyometrics including single leg hopping, WNL stair negotiation void of compensation, improving lower extremity functional scores      Problems/ barriers to goal attainment: hamstring weakness, SLS stabilization, hx recurrent ACL tears and reconstruction     Problem List: pain affecting function, decrease strength, decrease ADL/functional abilities, and decrease activity tolerance    Treatment Plan: Therapeutic exercise, Neuromuscular reeducation, Manual therapy, Therapeutic activity, Self care/home management, Aquatic therapy, and Gait training    Goals for this certification period to be accomplished in 8 treatments    Short Term Goals: To be accomplished in 8 treatments       Patient will be independent and compliant with HEP to progress toward goals and restore functional mobility.   Eval Status: issued at al  Current: In-progress, added partial lunges WBAT to HEP, 2024     Pt will

## 2025-01-06 ENCOUNTER — HOSPITAL ENCOUNTER (OUTPATIENT)
Facility: HOSPITAL | Age: 35
Setting detail: RECURRING SERIES
Discharge: HOME | End: 2025-01-09
Payer: COMMERCIAL

## 2025-01-06 PROCEDURE — 97110 THERAPEUTIC EXERCISES: CPT

## 2025-01-06 PROCEDURE — 97112 NEUROMUSCULAR REEDUCATION: CPT

## 2025-01-06 PROCEDURE — 97530 THERAPEUTIC ACTIVITIES: CPT

## 2025-01-06 NOTE — PROGRESS NOTES
knee PROM 1-0-120 to aid in functional mechanics for ambulation/ADLs.  Eval Status: 7-70 degrees 9/9/24  Current: Met, 0-128 degrees 10/7/24     Pt will demonstrate SLR x 10 reps void of quad lag to reduce risk of knee buckling and falls during ambulation.  Eval Status: quad lag with attempted SLR  Current: mild quad lag present, able to perform descent with minimal to no assist but requires assist for concentric portion to maintain TKE knee positioning, 9/13/24  Current: Met, 11/5/2024     Long Term Goals: To be accomplished in 24 treatments      Patient will improve LEFS score to 60 points to indicate significant improvement in functional status.  Eval Status: LEFS 20  Current: In-progress, 58, 12/2/2024     Pt will demonstrate right knee AROM 1-0-130 to aid in functional mechanics for ambulation/ADLs.  Eval Status: 8-60 AAROM  Current: Met, +1-132 degrees, 11/5/2024     Pt will have 5/5 right knee ext and flex strength to return to goals of lifting void of impairment.  Eval Status: grossly 2/5  Current: progressing Right knee Knee ext MMT: 5/5 Knee Flex MMT: 4/5 12/30/24     Patient will demonstrate ability to perform SL squat x 10 reps to 60 degrees knee flexion with good control to facilitate progression to return to plyometrics and running.  Eval Status: unable to perform due to post op status  Current:In-progress, 10 reps with fair control to 60 degrees knee flexion 12/30/24     Pt demonstrate ability to reciprocally negotiate stairs  void of AD with good mechanics void of compensatory movement to improve ease of community mobility.  Eval Status: non-reciprocal with B crutch assist  Current: Pt reports ability to perform normal reciprocal patterning when ascending and descending stairs void of AD  Met 10/28/24     6. Pt will demonstrate SL hop, triple hop, crossover hop of 90% of contralateral side to facilitate progression towards return to recreational sport participation.              Eval status: not

## 2025-01-13 ENCOUNTER — TELEPHONE (OUTPATIENT)
Facility: HOSPITAL | Age: 35
End: 2025-01-13

## 2025-01-13 ENCOUNTER — HOSPITAL ENCOUNTER (OUTPATIENT)
Facility: HOSPITAL | Age: 35
Setting detail: RECURRING SERIES
Discharge: HOME | End: 2025-01-16
Payer: COMMERCIAL

## 2025-01-13 PROCEDURE — 97530 THERAPEUTIC ACTIVITIES: CPT

## 2025-01-13 PROCEDURE — 97110 THERAPEUTIC EXERCISES: CPT

## 2025-01-13 PROCEDURE — 97112 NEUROMUSCULAR REEDUCATION: CPT

## 2025-01-13 NOTE — PROGRESS NOTES
PHYSICAL / OCCUPATIONAL THERAPY - DAILY TREATMENT NOTE     Patient Name: Torsten Starks    Date: 2025    : 1990  Insurance: Payor: REBA / Plan: FELISAPARUL POS / Product Type: *No Product type* /      Patient  verified Yes     Visit #   Current / Total 26 32   Time   In / Out 16:42 17:23   Pain   In / Out 0 0   Subjective Functional Status/Changes: Pt reports no new complaints    Changes to:  Allergies, Med Hx, Sx Hx?   no       TREATMENT AREA =  Right knee pain [M25.561]    If an interpreting service is utilized for treatment of this patient, the contents of this document represent the material reviewed with the patient via the .     OBJECTIVE    Therapeutic Procedures:  Tx Min Billable or 1:1 Min (if diff from Tx Min) Procedure, Rationale, Specifics   23  87748 Therapeutic Exercise (timed):  increase ROM, strength, coordination, balance, and proprioception to improve patient's ability to progress to PLOF and address remaining functional goals. (see flow sheet as applicable)    Details if applicable:       9  51280 Neuromuscular Re-Education (timed):  improve balance, coordination, kinesthetic sense, posture, core stability and proprioception to improve patient's ability to develop conscious control of individual muscles and awareness of position of extremities in order to progress to PLOF and address remaining functional goals. (see flow sheet as applicable)    Details if applicable:     9  06819 Therapeutic Activity (timed):  use of dynamic activities replicating functional movements to increase ROM, strength, coordination, balance, and proprioception in order to improve patient's ability to progress to PLOF and address remaining functional goals.  (see flow sheet as applicable)     Details if applicable:     41  [facility] Totals Reminder: bill using total billable min of TIMED therapeutic procedures (example: do not include dry needle or estim unattended, both untimed codes, in totals to

## 2025-01-17 ENCOUNTER — TELEPHONE (OUTPATIENT)
Facility: HOSPITAL | Age: 35
End: 2025-01-17

## 2025-01-20 ENCOUNTER — HOSPITAL ENCOUNTER (OUTPATIENT)
Facility: HOSPITAL | Age: 35
Setting detail: RECURRING SERIES
Discharge: HOME | End: 2025-01-23
Payer: COMMERCIAL

## 2025-01-20 PROCEDURE — 97110 THERAPEUTIC EXERCISES: CPT

## 2025-01-20 PROCEDURE — 97112 NEUROMUSCULAR REEDUCATION: CPT

## 2025-01-20 PROCEDURE — 97530 THERAPEUTIC ACTIVITIES: CPT

## 2025-01-20 NOTE — PROGRESS NOTES
jog/walk to HEP with pt performing several days per pt report 1/20/25     Pt will demonstrate right knee PROM 1-0-120 to aid in functional mechanics for ambulation/ADLs.  Eval Status: 7-70 degrees 9/9/24  Current: Met, 0-128 degrees 10/7/24     Pt will demonstrate SLR x 10 reps void of quad lag to reduce risk of knee buckling and falls during ambulation.  Eval Status: quad lag with attempted SLR  Current: mild quad lag present, able to perform descent with minimal to no assist but requires assist for concentric portion to maintain TKE knee positioning, 9/13/24  Current: Met, 11/5/2024     Long Term Goals: To be accomplished in 24 treatments      Patient will improve LEFS score to 60 points to indicate significant improvement in functional status.  Eval Status: LEFS 20  Current: In-progress, 58, 12/2/2024     Pt will demonstrate right knee AROM 1-0-130 to aid in functional mechanics for ambulation/ADLs.  Eval Status: 8-60 AAROM  Current: Met, +1-132 degrees, 11/5/2024     Pt will have 5/5 right knee ext and flex strength to return to goals of lifting void of impairment.  Eval Status: grossly 2/5  Current: progressing Right knee Knee ext MMT: 5/5 Knee Flex MMT: 4/5 12/30/24     Patient will demonstrate ability to perform SL squat x 10 reps to 60 degrees knee flexion with good control to facilitate progression to return to plyometrics and running.  Eval Status: unable to perform due to post op status  Current: Pt able to perform 10 SL squats with good control and mild instability, void of UE assist 1/20/25     Pt demonstrate ability to reciprocally negotiate stairs  void of AD with good mechanics void of compensatory movement to improve ease of community mobility.  Eval Status: non-reciprocal with B crutch assist  Current: Pt reports ability to perform normal reciprocal patterning when ascending and descending stairs void of AD  Met 10/28/24     6. Pt will demonstrate SL hop, triple hop, crossover hop of 90% of

## 2025-02-03 ENCOUNTER — HOSPITAL ENCOUNTER (OUTPATIENT)
Facility: HOSPITAL | Age: 35
Setting detail: RECURRING SERIES
Discharge: HOME | End: 2025-02-06
Payer: COMMERCIAL

## 2025-02-03 PROCEDURE — 97112 NEUROMUSCULAR REEDUCATION: CPT

## 2025-02-03 PROCEDURE — 97530 THERAPEUTIC ACTIVITIES: CPT

## 2025-02-03 PROCEDURE — 97110 THERAPEUTIC EXERCISES: CPT

## 2025-02-03 NOTE — PROGRESS NOTES
PHYSICAL / OCCUPATIONAL THERAPY - DAILY TREATMENT NOTE     Patient Name: Torsten Starks    Date: 2/3/2025    : 1990  Insurance: Payor: REBA / Plan: REBA POS / Product Type: *No Product type* /      Patient  verified Yes     Visit #   Current / Total 28 32   Time   In / Out 4:50 pm 6:00 pm   Pain   In / Out 0 0   Subjective Functional Status/Changes: Pt reports no pain though notes that his knee will feel tight when trying to bend all the way such as \"when sitting like in a catcher's position\". Notes otherwise he has been doing everything throughout the day without eliana issues, working out several times weekly, and has done several 20 minute jog/walks void of knee pain, swelling, or any other concern. Reports he is slowly increasing how much he is jogging.   Changes to:  Allergies, Med Hx, Sx Hx?   no       TREATMENT AREA =  Right knee pain [M25.561]    If an interpreting service is utilized for treatment of this patient, the contents of this document represent the material reviewed with the patient via the .     OBJECTIVE      Therapeutic Procedures:  Tx Min Billable or 1:1 Min (if diff from Tx Min) Procedure, Rationale, Specifics   25  76627 Therapeutic Exercise (timed):  increase ROM, strength, coordination, balance, and proprioception to improve patient's ability to progress to PLOF and address remaining functional goals. (see flow sheet as applicable)    Details if applicable:       112 Neuromuscular Re-Education (timed):  improve balance, coordination, kinesthetic sense, posture, core stability and proprioception to improve patient's ability to develop conscious control of individual muscles and awareness of position of extremities in order to progress to PLOF and address remaining functional goals. (see flow sheet as applicable)    Details if applicable:     25  74612 Therapeutic Activity (timed):  use of dynamic activities replicating functional movements to increase ROM,

## 2025-02-03 NOTE — PROGRESS NOTES
[facility]  [address] [address]                                                                                      Phone: [phone]   Fax: [phone]    Progress Note  Patient name: Torsten Starks Start of Care: 24    Referral source: Torsten Aguilar MD : 1990   Medical/Treatment Diagnosis: Right knee pain [M25.561] Onset Date:DOS 24       Prior Hospitalization: see medical history Provider#: [id number]   Medications: Verified on Patient Summary List     Comorbidities:  Musculoskeletal disorders left ACL reconstruction, right ACL reconstruction in , tonsillectomy, adenoidectomy   Prior Level of Function:functionally independent, no AD, Active lifestyle, volleyball, kickball, softball recreationally     Reporting Period: 24-2/3/25    Visits from Start of Care: 28    Missed Visits: 0    Updated Goals/Measure of Progress: To be achieved in 4 weeks:    Short Term Goals: To be accomplished in 8 treatments       Patient will be independent and compliant with HEP to progress toward goals and restore functional mobility.   Eval Status: issued at eval  Current: In-progress, added partial lunges WBAT to HEP, 2024 Added jog/walk to HEP with pt performing several days per pt report 25     Pt will demonstrate right knee PROM 1-0-120 to aid in functional mechanics for ambulation/ADLs.  Eval Status: 7-70 degrees 24  Current: Met, 0-128 degrees 10/7/24 4-0-143 degrees PROM (within 2 degrees of the contralateral side)     Pt will demonstrate SLR x 10 reps void of quad lag to reduce risk of knee buckling and falls during ambulation.  Eval Status: quad lag with attempted SLR  Current: mild quad lag present, able to perform descent with minimal to no assist but requires assist for concentric portion to maintain TKE knee positioning, 24  Current: Met, 2024     Long Term Goals: To be accomplished in 24 treatments      Patient will

## 2025-02-10 ENCOUNTER — TELEPHONE (OUTPATIENT)
Facility: HOSPITAL | Age: 35
End: 2025-02-10

## 2025-02-18 ENCOUNTER — HOSPITAL ENCOUNTER (OUTPATIENT)
Facility: HOSPITAL | Age: 35
Setting detail: RECURRING SERIES
Discharge: HOME | End: 2025-02-21
Payer: COMMERCIAL

## 2025-02-18 PROCEDURE — 97110 THERAPEUTIC EXERCISES: CPT

## 2025-02-18 PROCEDURE — 97112 NEUROMUSCULAR REEDUCATION: CPT

## 2025-02-18 PROCEDURE — 97530 THERAPEUTIC ACTIVITIES: CPT

## 2025-02-18 NOTE — PROGRESS NOTES
PHYSICAL / OCCUPATIONAL THERAPY - DAILY TREATMENT NOTE     Patient Name: Torsten Starks    Date: 2025    : 1990  Insurance: Payor: REBA / Plan: FELISAPARUL POS / Product Type: *No Product type* /      Patient  verified Yes     Visit #   Current / Total 29 32   Time   In / Out 1010 1110   Pain   In / Out 0 0   Subjective Functional Status/Changes: Pt denied pain and noted jogged 1 mile over weekend with no pain but leg fatigue.   Changes to:  Allergies, Med Hx, Sx Hx?   no       TREATMENT AREA =  Right knee pain [M25.561]    If an interpreting service is utilized for treatment of this patient, the contents of this document represent the material reviewed with the patient via the .     OBJECTIVE  Therapeutic Procedures:  Tx Min Billable or 1:1 Min (if diff from Tx Min) Procedure, Rationale, Specifics   20  57489 Therapeutic Exercise (timed):  increase ROM, strength, coordination, balance, and proprioception to improve patient's ability to progress to PLOF and address remaining functional goals. (see flow sheet as applicable)    Details if applicable:       30  56606 Neuromuscular Re-Education (timed):  improve balance, coordination, kinesthetic sense, posture, core stability and proprioception to improve patient's ability to develop conscious control of individual muscles and awareness of position of extremities in order to progress to PLOF and address remaining functional goals. (see flow sheet as applicable)    Details if applicable:     10  42592 Therapeutic Activity (timed):  use of dynamic activities replicating functional movements to increase ROM, strength, coordination, balance, and proprioception in order to improve patient's ability to progress to PLOF and address remaining functional goals.  (see flow sheet as applicable)     Details if applicable:           Details if applicable:            Details if applicable:     60  [facility] Totals Reminder: bill using total billable min of

## 2025-02-25 ENCOUNTER — HOSPITAL ENCOUNTER (OUTPATIENT)
Facility: HOSPITAL | Age: 35
Setting detail: RECURRING SERIES
Discharge: HOME | End: 2025-02-28
Payer: COMMERCIAL

## 2025-02-25 PROCEDURE — 97112 NEUROMUSCULAR REEDUCATION: CPT

## 2025-02-25 PROCEDURE — 97110 THERAPEUTIC EXERCISES: CPT

## 2025-02-25 PROCEDURE — 97530 THERAPEUTIC ACTIVITIES: CPT

## 2025-02-25 NOTE — PROGRESS NOTES
PHYSICAL / OCCUPATIONAL THERAPY - DAILY TREATMENT NOTE     Patient Name: Torsten Starks    Date: 2025    : 1990  Insurance: Payor: REBA / Plan: REBA POS / Product Type: *No Product type* /      Patient  verified Yes     Visit #   Current / Total 30 32   Time   In / Out 5:25 pm 6:15 pm   Pain   In / Out 0 0   Subjective Functional Status/Changes: Pt reports he has been running up to 1.75 miles noting no pain or swelling in his knee and is limited by cardiovascular conditioning. States he has been continuing to work on strengthening a few days a week and has had no issues. Notes he has not yet returned to volleyball, kickball, or softball but has no plans in the near future to do so.   Changes to:  Allergies, Med Hx, Sx Hx?   no       TREATMENT AREA =  Right knee pain [M25.561]    If an interpreting service is utilized for treatment of this patient, the contents of this document represent the material reviewed with the patient via the .     OBJECTIVE  Therapeutic Procedures:  Tx Min Billable or 1:1 Min (if diff from Tx Min) Procedure, Rationale, Specifics   10  39830 Therapeutic Exercise (timed):  increase ROM, strength, coordination, balance, and proprioception to improve patient's ability to progress to PLOF and address remaining functional goals. (see flow sheet as applicable)    Details if applicable:       15  07584 Neuromuscular Re-Education (timed):  improve balance, coordination, kinesthetic sense, posture, core stability and proprioception to improve patient's ability to develop conscious control of individual muscles and awareness of position of extremities in order to progress to PLOF and address remaining functional goals. (see flow sheet as applicable)    Details if applicable:     25  29262 Therapeutic Activity (timed):  use of dynamic activities replicating functional movements to increase ROM, strength, coordination, balance, and proprioception in order to improve

## 2025-02-26 NOTE — PROGRESS NOTES
[facility]  [address] [address]  Phone: [phone]   Fax: [phone]    Discharge Summary    Patient name: Torsten Starks Start of Care: 24    Referral source: Torsten Aguilar MD : 1990   Medical/Treatment Diagnosis: Right knee pain [M25.561] Onset Date:DOS 24       Prior Hospitalization: see medical history Provider#: [id number]   Medications: Verified on Patient Summary List     Comorbidities:  Musculoskeletal disorders left ACL reconstruction, right ACL reconstruction in , tonsillectomy, adenoidectomy   Prior Level of Function:functionally independent, no AD, Active lifestyle, volleyball, kickball, softball recreationally      Reporting Period: 24-25     Visits from Start of Care: 30                                                Missed Visits: 0    Goals/Measure of Progress:  Short Term Goals: To be accomplished in 8 treatments       Patient will be independent and compliant with HEP to progress toward goals and restore functional mobility.   Eval Status: issued at [facility] [place]  Current: In-progress, added partial lunges WBAT to HEP, 2024 Added jog/walk to HEP with pt performing several days per pt report 25     Pt will demonstrate right knee PROM 1-0-120 to aid in functional mechanics for ambulation/ADLs.  Eval Status: 7-70 degrees 24  Current: Met, 0-128 degrees 10/7/24 4-0-143 degrees PROM (within 2 degrees of the contralateral side)     Pt will demonstrate SLR x 10 reps void of quad lag to reduce risk of knee buckling and falls during ambulation.  Eval Status: quad lag with attempted SLR  Current: mild quad lag present, able to perform descent with minimal to no assist but requires assist for concentric portion to maintain TKE knee positioning, 24  Current: Met, 2024     Long Term Goals: To be accomplished in 24 treatments      Patient will improve LEFS score to 60 points to indicate significant improvement